# Patient Record
Sex: FEMALE | Race: WHITE | URBAN - METROPOLITAN AREA
[De-identification: names, ages, dates, MRNs, and addresses within clinical notes are randomized per-mention and may not be internally consistent; named-entity substitution may affect disease eponyms.]

---

## 2017-01-12 ENCOUNTER — TRANSFERRED RECORDS (OUTPATIENT)
Dept: HEALTH INFORMATION MANAGEMENT | Facility: CLINIC | Age: 67
End: 2017-01-12

## 2017-06-29 ENCOUNTER — TRANSFERRED RECORDS (OUTPATIENT)
Dept: HEALTH INFORMATION MANAGEMENT | Facility: CLINIC | Age: 67
End: 2017-06-29

## 2018-01-17 ENCOUNTER — TRANSFERRED RECORDS (OUTPATIENT)
Dept: HEALTH INFORMATION MANAGEMENT | Facility: CLINIC | Age: 68
End: 2018-01-17

## 2018-06-08 ENCOUNTER — TRANSFERRED RECORDS (OUTPATIENT)
Dept: HEALTH INFORMATION MANAGEMENT | Facility: CLINIC | Age: 68
End: 2018-06-08

## 2018-06-27 ENCOUNTER — TRANSFERRED RECORDS (OUTPATIENT)
Dept: HEALTH INFORMATION MANAGEMENT | Facility: CLINIC | Age: 68
End: 2018-06-27

## 2018-06-29 ENCOUNTER — TRANSFERRED RECORDS (OUTPATIENT)
Dept: HEALTH INFORMATION MANAGEMENT | Facility: CLINIC | Age: 68
End: 2018-06-29

## 2018-08-13 ENCOUNTER — TELEPHONE (OUTPATIENT)
Dept: NEUROLOGY | Facility: CLINIC | Age: 68
End: 2018-08-13

## 2018-08-13 NOTE — TELEPHONE ENCOUNTER
[facility]    Phone Message    May a detailed message be left on voicemail: yes    Reason for Call: Other: Please call the pt.  She has questions regarding a MyChart form the Dr wanted her to fill out.     Action Taken: Message routed to:  [facility] ([facility] Neurology

## 2018-10-03 ENCOUNTER — PATIENT OUTREACH (OUTPATIENT)
Dept: CARE COORDINATION | Facility: CLINIC | Age: 68
End: 2018-10-03

## 2018-10-04 ENCOUNTER — OFFICE VISIT (OUTPATIENT)
Dept: NEUROLOGY | Facility: CLINIC | Age: 68
End: 2018-10-04
Payer: COMMERCIAL

## 2018-10-04 VITALS
SYSTOLIC BLOOD PRESSURE: 110 MMHG | HEIGHT: 61 IN | DIASTOLIC BLOOD PRESSURE: 68 MMHG | OXYGEN SATURATION: 98 % | WEIGHT: 91.1 LBS | HEART RATE: 69 BPM | BODY MASS INDEX: 17.2 KG/M2

## 2018-10-04 DIAGNOSIS — R93.89 ABNORMAL MRI: Primary | ICD-10-CM

## 2018-10-04 PROBLEM — T42.4X2A INTENTIONAL BENZODIAZEPINE OVERDOSE (H): Status: ACTIVE | Noted: 2018-01-03

## 2018-10-04 PROBLEM — S72.002A CLOSED LEFT HIP FRACTURE, INITIAL ENCOUNTER (H): Status: ACTIVE | Noted: 2018-08-15

## 2018-10-04 PROBLEM — F45.1 SOMATIC SYMPTOM DISORDER, PERSISTENT, SEVERE, WITH PREDOMINANT PAIN: Status: ACTIVE | Noted: 2018-01-04

## 2018-10-04 PROBLEM — R45.851 SUICIDAL IDEATION: Status: ACTIVE | Noted: 2018-01-03

## 2018-10-04 PROBLEM — F41.9 ANXIETY: Status: ACTIVE | Noted: 2017-07-03

## 2018-10-04 PROBLEM — Z98.890 S/P ORIF (OPEN REDUCTION INTERNAL FIXATION) FRACTURE: Status: ACTIVE | Noted: 2018-08-29

## 2018-10-04 PROBLEM — S72.001A CLOSED FRACTURE OF RIGHT HIP (H): Status: ACTIVE | Noted: 2018-08-29

## 2018-10-04 PROBLEM — Z87.81 S/P ORIF (OPEN REDUCTION INTERNAL FIXATION) FRACTURE: Status: ACTIVE | Noted: 2018-08-29

## 2018-10-04 PROBLEM — E44.0 MODERATE PROTEIN-CALORIE MALNUTRITION (H): Status: ACTIVE | Noted: 2017-12-01

## 2018-10-04 RX ORDER — CARBOXYMETHYLCELLULOSE SODIUM 5 MG/ML
1 SOLUTION/ DROPS OPHTHALMIC PRN
COMMUNITY
Start: 2018-06-01

## 2018-10-04 RX ORDER — CALCIUM CARBONATE 500 MG/1
500 TABLET, CHEWABLE ORAL PRN
COMMUNITY
Start: 2018-08-23

## 2018-10-04 RX ORDER — ACETAMINOPHEN 325 MG/1
650 TABLET ORAL PRN
COMMUNITY
Start: 2018-08-18

## 2018-10-04 RX ORDER — FLUTICASONE PROPIONATE 50 MCG
2 SPRAY, SUSPENSION (ML) NASAL DAILY
COMMUNITY
Start: 2018-01-14 | End: 2019-01-14

## 2018-10-04 RX ORDER — CLONAZEPAM 1 MG/1
1 TABLET ORAL AT BEDTIME
Refills: 5 | COMMUNITY
Start: 2018-08-22

## 2018-10-04 RX ORDER — BACLOFEN 10 MG/1
TABLET ORAL PRN
Refills: 1 | COMMUNITY
Start: 2018-06-27

## 2018-10-04 ASSESSMENT — ENCOUNTER SYMPTOMS
PANIC: 0
DECREASED CONCENTRATION: 0
DIARRHEA: 1
DEPRESSION: 1
TINGLING: 1
NUMBNESS: 1
INSOMNIA: 1
TROUBLE SWALLOWING: 1
NERVOUS/ANXIOUS: 1
CONSTIPATION: 1
DOUBLE VISION: 1

## 2018-10-04 ASSESSMENT — PAIN SCALES - GENERAL: PAINLEVEL: EXTREME PAIN (9)

## 2018-10-04 NOTE — PROGRESS NOTES
DEPARTMENT OF NEUROLOGY    Patient Name:  Octavia Eason  MRN:  [id number]    :  1950  Date of Clinic Visit:  2018  Primary Care Provider:  Carly Wilde    CHIEF COMPLAINT: numbness    HISTORY OF PRESENT ILLNESS:  Octavia Eason is a 67 year old female presenting, history of burning mouth syndrome, GERD, heartburn, chronic balance issues, who presents for opinion on numbness.  She is accompanied by PCA.  She states the following:    Patient describes a 6-year history of numbness in her lower extremities primarily.  She does give several contradictory answers in this regard, but eventually settles on a potentially slow beginning mostly in the feet but also possibly in the calves all at once roughly 6 years ago.  This was potentially around the time that her  passed away.  She has since then intermittently had some balance issues.  She first states that she does not think that the numbness has ascended up her legs to her knees.  She later states that may be in fact this is the case.  She later states that she thinks she has been having months to may be years of a numb feeling in her hands.  She states that moving around in bed and movement of the bed sheets give her a funny feeling.  She denies that it is true pain.  She denies that is worsening of her numbness or other numbing or tingling.  She unfortunately does have a fair amount of trouble describing why she is here today.  We did discuss her various history of balance issues and related disorders as I was able to find them on chart review.  She does note that she has been following with physical therapy and pain medicine.  She thinks that everything is been going well.  However thinks she does state that she also believes things became worse several weeks ago following hip surgery.  She is unsure why.  When asked about new activities of daily living that perhaps her not as easy as they were prior to the surgery, she is unsure of any  "specifics.  She does note that she is on clonazepam and thinks that that helps.  She does not believe at present her burning mouth syndrome is an issue due to the clonazepam working.  She also states that the low, per chart review, she has had difficulties with loss of weight and adult failure to thrive, she does state that she is currently and most recently been gaining some of this weight back and feels quite happy that this is been the case.    Per chart review, the patient has previously been seen by at least 2 other neurologist, and the [facility] and previously at the [facility] of neurology.  She has at present had normal MRIs and EMGs per chart review.      REVIEW OF SYSTEMS:  A ten point review of systems was negative except as indicated above or in the patient's self-reported answers located at the end of this note.     ALLERGIES:  Allergies   Allergen Reactions     Duloxetine Hcl Other (See Comments)     Pilocarpine Other (See Comments)     Gabapentin Other (See Comments)     Racing heart     Lyrica [Pregabalin] Other (See Comments)     Felt \"High\"     Nortriptyline Unknown     Sulfa Drugs GI Disturbance     Tizanidine Other (See Comments)     Pt felt very unbalance and dizzy.      Tolterodine Unknown     Trazodone Unknown     MEDICATIONS:  Current Outpatient Prescriptions   Medication Sig Dispense Refill     acetaminophen (TYLENOL) 325 MG tablet Take 650 mg by mouth as needed       acetaminophen-codeine (TYLENOL #3) 300-30 MG per tablet Take by mouth as needed       baclofen (LIORESAL) 10 MG tablet as needed  1     bismuth subsalicylate (PEPTO BISMOL) 262 MG/15ML suspension Take 30 mLs by mouth daily       calcium carbonate (TUMS) 500 MG chewable tablet Take 500 mg by mouth as needed       carboxymethylcellulose (REFRESH PLUS) 0.5 % SOLN ophthalmic solution Apply 1 drop to eye as needed       cholecalciferol (VITAMIN D3) 1000 UNIT tablet Take 1,000 Units by mouth as needed       " "clonazePAM (KLONOPIN) 1 MG tablet Take 1 mg by mouth At Bedtime  5     FLUoxetine (PROZAC) 20 MG capsule Take 40 mg by mouth daily       fluticasone (FLONASE) 50 MCG/ACT spray 2 sprays daily       magic mouthwash (ENTER INGREDIENTS IN COMMENTS) suspension daily       omeprazole (PRILOSEC) 20 MG CR capsule Take 20 mg by mouth 2 times daily  1     SHINGRIX injection ADM 0.5ML IM UTD  0     PAST MEDICAL HISTORY:  No past medical history on file.     PAST SURGICAL HISTORY:  No past surgical history on file.     - Colon, does not remember the nature of this  - Plate put in on left hip, within last 3-4 weeks, she is unsure of the date      SOCIAL HISTORY:  Social History     Social History     Marital status:      Spouse name: N/A     Number of children: N/A     Years of education: N/A     Occupational History     Not on file.     Social History Main Topics     Smoking status: Never Smoker     Smokeless tobacco: Never Used     Alcohol use No     Drug use: No     Sexual activity: Not on file     Other Topics Concern     Not on file     Social History Narrative     No narrative on file     FAMILY HISTORY:  No family history on file.      PHYSICAL EXAMINATION:    Vitals:   /68 (BP Location: Left arm, Patient Position: Sitting, Cuff Size: Adult Small)  Pulse 69  Ht 1.537 m (5' 0.5\")  Wt 41.3 kg (91 lb 1.6 oz)  SpO2 98%  BMI 17.5 kg/m2    -General: Woman sitting comfortably on the chair. No acute distress. Underweight     -HEENT: No skin discolorations noted, no carotid bruits     -Chest: RRR without murmurs or bruits     -Abdomen: Positive bowel sounds, soft, non-tender, no organomegaly    -Musculoskeletal: Visible hammertoe in the left great toe    -Neurological:     --MS: Patient is alert, attentive, and oriented. Speech is clear and fluid though she is intermittently tangential. Names normally. Registration intact.     --> Mini-Co/5    --CNs: Visual fields are full to confrontation. Normal fundoscopic " exam with no visualized vascular changes. Pupils are briskly reactive to light. Visual fields full. Ocular motility full without nystagmus, facial sensation intact, muscles of mastication and facial expression normal, hearing intact, gag and palate elevation normal, sternomastoid and trapezius function normal, tongue motions normal     --Motor: Normal muscle tone and bulk. 5/5 muscle strength bilaterally in upper and lower extremities    --Reflexes: 3+ reflexes at knees and biceps, triceps, brachioradialis, and ankles. Plantar responses flexor bilaterally.  No clonus.    --Sensory: Light touch, vibration and PP intact bilaterally in upper and lower extremities.    --Coordination: Has some difficulties with mild tremor at the end of movement with finger-nose-finger on the left side.  Right FMF and is intact.  Heel doherty intact.  Romberg is negative.     --Gait: Stands with feet normally spaced.  Gait is slow but steady.  She is able to walk the duration of the hallway without her walker.  She is able to take 2-3 steps on her toes.  She was unable to walk on her heels or successfully perform tandem walk      INVESTIGATIONS:   All available and relevant labs, imaging, and other procedures were reviewed with the patient at this visit.       IMPRESSION AND RECOMMENDATIONS:  67-year-old woman with history of burning mouth syndrome, imbalance, GERD, who presents for opinion on ongoing numbness.  This is been present for over 6 years.  Per the available history, it does not clearly fit into a length dependent progressive pattern though this is the closest that she was able to describe her symptoms.  No clear deficits on examination; notably her examination does not support a length dependent neuropathy or give other clear evidence of neuropathy.  Significantly, she has and continues to have multiple neurological symptoms but has not however had any clearly abnormal test results.  Chart review revealed that she has had normal  MRIs of the brain and cervical spine and an EMG within the last year that did not reveal any clear evidence for her numbness or previously described balance difficulties.  At present, I do not see a clear reason to repeat these.  Given her stated history and examination, there is no clear evidence for an underlying neurological disease process to explain her symptoms at this time.  I would encourage her to continue to work with physical therapy and her pain management team as these seem to be per her report and per the chart review, the most significantly impacting interventions that she has had to date.  I discussed this with the patient who registered her understanding and agreement with this.  I also discussed with her that it does seem to be an overall complexity to her endorsed symptoms and care; significantly she also appears to be seeing multiple providers in multiple healthcare systems.  I do worry about fragmentation of care in this setting.  I did discuss that we would be happy to see her back in this clinic if she were to have new or different symptoms.  I however also asked that the referring physician call or message our clinic so that we may discuss with them their concerns prior to a new consult so that we could  assist in any necessary consolidation of her care.    This note was completed using dragon software.  All efforts were made to correct any unintended errors in real-time.    Patient was seen and discussed with Dr. Sada Mora MD  [facility] Department of Neurology PGY4    This is a supervisory note for Dr. Mora.  I have reviewed documentation above and also personally confirmed history and physical examination findings I agree with the plan as listed.  The patient has previously seen multiple other neurologist and undergone thorough workup without any clear neurologic disease being diagnosed.  Examination today is normal despite the patient's complaints.   At this point in time we concur with the neurologist findings from outside facilities that there is no clear underlying neurologic disorder.  The patient was provided reassurance.  Sada Maxwell MD FAAN      Answers for HPI/ROS submitted by the patient on 10/4/2018   General Symptoms: No  Skin Symptoms: No  HENT Symptoms: Yes  EYE SYMPTOMS: Yes  HEART SYMPTOMS: No  LUNG SYMPTOMS: No  INTESTINAL SYMPTOMS: Yes  URINARY SYMPTOMS: Yes  GYNECOLOGIC SYMPTOMS: No  BREAST SYMPTOMS: No  SKELETAL SYMPTOMS: No  BLOOD SYMPTOMS: No  NERVOUS SYSTEM SYMPTOMS: Yes  MENTAL HEALTH SYMPTOMS: Yes  Tinnitus: Yes  Trouble swallowing: Yes  Dry eyes: Yes  Double vision: Yes  Constipation: Yes  Diarrhea: Yes  Trouble holding urine or incontinence: Yes  Tingling: Yes  Numbness: Yes  Nervous or Anxious: Yes  Depression: Yes  Trouble sleeping: Yes  Trouble thinking or concentrating: No  Mood changes: No  Panic attacks: No  PHQ-2 Score: Incomplete

## 2018-10-04 NOTE — MR AVS SNAPSHOT
After Visit Summary   10/4/2018    Octavia Eason    MRN: [id number]           Patient Information     Date Of Birth          1950        Visit Information        Provider Department      10/4/2018 2:05 PM Davida Mora MD [facility] Neurology        Today's Diagnoses     Abnormal MRI    -  1      Care Instructions    It was a pleasure to see you in clinic today.    After discussing your symptoms and overall concerns, we discussed that we do not see any new evidence of an underlying neurological disease to explain your symptoms.  We discussed that he should continue to see physical therapy and pain management as well as her primary care physician for ongoing evaluation and care.  No new necessary testing from the neurology perspective given your examination today as well as your relatively recent negative MRIs and EMG.      Please call into neurology clinic or send a message via [employer] if you have any further questions or concerns.       Davida Mora MD  Neurology resident           Follow-ups after your visit        Follow-up notes from your care team     Return if symptoms worsen or fail to improve.      Who to contact     Please call your clinic at [phone] to:    Ask questions about your health    Make or cancel appointments    Discuss your medicines    Learn about your test results    Speak to your doctor            Additional Information About Your Visit        [facility] [employer] Information     [employer] gives you secure access to your electronic health record. If you see a primary care provider, you can also send messages to your care team and make appointments. If you have questions, please call your primary care clinic.  If you do not have a primary care provider, please call [phone] and they will assist you.      [employer] is an electronic gateway that provides easy, online access to your medical records. With [employer], you can request a clinic appointment, read your test results,  "renew a prescription or communicate with your care team.     To access your existing account, please contact your [facility] or call [phone] for assistance.        Care EveryWhere ID     This is your Care EveryWhere ID. This could be used by other organizations to access your [place] medical records  [id number]        Your Vitals Were     Pulse Height Pulse Oximetry BMI (Body Mass Index)          69 1.537 m (5' 0.5\") 98% 17.5 kg/m2         Blood Pressure from Last 3 Encounters:   10/04/18 110/68    Weight from Last 3 Encounters:   10/04/18 41.3 kg (91 lb 1.6 oz)              Today, you had the following     No orders found for display      Information about OPIOIDS     PRESCRIPTION OPIOIDS: WHAT YOU NEED TO KNOW   We gave you an opioid (narcotic) pain medicine. It is important to manage your pain, but opioids are not always the best choice. You should first try all the other options your care team gave you. Take this medicine for as short a time (and as few doses) as possible.    Some activities can increase your pain, such as bandage changes or therapy sessions. It may help to take your pain medicine 30 to 60 minutes before these activities. Reduce your stress by getting enough sleep, working on hobbies you enjoy and practicing relaxation or meditation. Talk to your care team about ways to manage your pain beyond prescription opioids.    These medicines have risks:    DO NOT drive when on new or higher doses of pain medicine. These medicines can affect your alertness and reaction times, and you could be arrested for driving under the influence (DUI). If you need to use opioids long-term, talk to your care team about driving.    DO NOT operate heavy machinery    DO NOT do any other dangerous activities while taking these medicines.    DO NOT drink any alcohol while taking these medicines.     If the opioid prescribed includes acetaminophen, DO NOT take with any other medicines " that contain acetaminophen. Read all labels carefully. Look for the word  acetaminophen  or  Tylenol.  Ask your pharmacist if you have questions or are unsure.    You can get addicted to pain medicines, especially if you have a history of addiction (chemical, alcohol or substance dependence). Talk to your care team about ways to reduce this risk.    All opioids tend to cause constipation. Drink plenty of water and eat foods that have a lot of fiber, such as fruits, vegetables, prune juice, apple juice and high-fiber cereal. Take a laxative (Miralax, milk of magnesia, Colace, Senna) if you don t move your bowels at least every other day. Other side effects include upset stomach, sleepiness, dizziness, throwing up, tolerance (needing more of the medicine to have the same effect), physical dependence and slowed breathing.    Store your pills in a secure place, locked if possible. We will not replace any lost or stolen medicine. If you don t finish your medicine, please throw away (dispose) as directed by your pharmacist. The [place] Pollution Control Agency has more information about safe disposal: [email or web address]         Primary Care Provider Office Phone # Fax #    Carly Wilde [phone]       [facility] [place] [address] [place] [address]        Equal Access to Services     ARCHIE KELLER : Hadii aurora ku hadasho Soomaali, waaxda luqadaha, qaybta kaalmada adesheliayada, mirella wick. So [facility] [phone].    ATENCIÓN: Si habla español, tiene a ngo disposición servicios gratuitos de asistencia lingüística. Monik wynn [phone].    We comply with applicable federal civil rights laws and [place] laws. We do not discriminate on the basis of race, color, national origin, age, disability, sex, sexual orientation, or gender identity.            Thank you!     Thank you for choosing [facility]  for your care. Our goal is always to provide you with excellent care. Hearing back from our patients is one way we can continue to improve our services. Please take a few minutes to complete the written survey that you may receive in the mail after your visit with us. Thank you!             Your Updated Medication List - Protect others around you: Learn how to safely use, store and throw away your medicines at www.disposemymeds.org.          This list is accurate as of 10/4/18 11:59 PM.  Always use your most recent med list.                   Brand Name Dispense Instructions for use Diagnosis    acetaminophen 325 MG tablet    TYLENOL     Take 650 mg by mouth as needed        acetaminophen-codeine 300-30 MG per tablet    TYLENOL #3     Take by mouth as needed        baclofen 10 MG tablet    LIORESAL     as needed        bismuth subsalicylate 262 MG/15ML suspension    PEPTO BISMOL     Take 30 mLs by mouth daily        calcium carbonate 500 MG chewable tablet    TUMS     Take 500 mg by mouth as needed        carboxymethylcellulose 0.5 % Soln ophthalmic solution    REFRESH PLUS     Apply 1 drop to eye as needed        cholecalciferol 1000 UNIT tablet    vitamin D3     Take 1,000 Units by mouth as needed        clonazePAM 1 MG tablet    klonoPIN     Take 1 mg by mouth At Bedtime        FLUoxetine 20 MG capsule    PROzac     Take 40 mg by mouth daily        fluticasone 50 MCG/ACT spray    FLONASE     2 sprays daily        magic mouthwash suspension    ENTER INGREDIENTS IN COMMENTS     daily        omeprazole 20 MG CR capsule    priLOSEC     Take 20 mg by mouth 2 times daily        SHINGRIX injection   Generic drug:  zoster vaccine recombinant adjuvanted      ADM 0.5ML IM UTD

## 2018-10-04 NOTE — NURSING NOTE
Chief Complaint   Patient presents with     New Patient     P RETURN - POSSIBLE DEMYLENATING DISEASE       Depression Response    Patient completed the PHQ-9 assessment for depression and scored >9? Yes  Question 9 on the PHQ-9 was positive for suicidality? No  Is the patient already receiving treatment for depression? Yes  Patient would like to speak with behavioral health team ([facility] clinics only)? No    I personally notified the following: visit provider    Behavioral Health/Social Work Contact Information     [facility]  Go Puga MA, LMFT  Lead Behavioral Health Clinician  Phone: [phone]  [facility] Pager: [phone]    Non-[facility] Clinics  [facility] On-Call   Pager: [phone]       Memo Aly, EMT

## 2018-10-04 NOTE — LETTER
10/4/2018       RE: Octavia Eason  [address]  [address]  [place] [address]     Dear Colleague,    Thank you for referring your patient, Octavia Eason, to the [facility]UR[facility] at [facility]. Please see a copy of my visit note below.      DEPARTMENT OF NEUROLOGY    Patient Name:  Octavia Eason  MRN:  [id number]    :  1950  Date of Clinic Visit:  2018  Primary Care Provider:  Carly Wilde    CHIEF COMPLAINT: numbness    HISTORY OF PRESENT ILLNESS:  Octavia Eason is a 67 year old female presenting, history of burning mouth syndrome, GERD, heartburn, chronic balance issues, who presents for opinion on numbness.  She is accompanied by PCA.  She states the following:    Patient describes a 6-year history of numbness in her lower extremities primarily.  She does give several contradictory answers in this regard, but eventually settles on a potentially slow beginning mostly in the feet but also possibly in the calves all at once roughly 6 years ago.  This was potentially around the time that her  passed away.  She has since then intermittently had some balance issues.  She first states that she does not think that the numbness has ascended up her legs to her knees.  She later states that may be in fact this is the case.  She later states that she thinks she has been having months to may be years of a numb feeling in her hands.  She states that moving around in bed and movement of the bed sheets give her a funny feeling.  She denies that it is true pain.  She denies that is worsening of her numbness or other numbing or tingling.  She unfortunately does have a fair amount of trouble describing why she is here today.  We did discuss her various history of balance issues and related disorders as I was able to find them on chart review.  She does note that she has been following with physical therapy and pain medicine.  She thinks that everything  "is been going well.  However thinks she does state that she also believes things became worse several weeks ago following hip surgery.  She is unsure why.  When asked about new activities of daily living that perhaps her not as easy as they were prior to the surgery, she is unsure of any specifics.  She does note that she is on clonazepam and thinks that that helps.  She does not believe at present her burning mouth syndrome is an issue due to the clonazepam working.  She also states that the low, per chart review, she has had difficulties with loss of weight and adult failure to thrive, she does state that she is currently and most recently been gaining some of this weight back and feels quite happy that this is been the case.    Per chart review, the patient has previously been seen by at least 2 other neurologist, and the [facility] system and previously at the [facility] of neurology.  She has at present had normal MRIs and EMGs per chart review.      REVIEW OF SYSTEMS:  A ten point review of systems was negative except as indicated above or in the patient's self-reported answers located at the end of this note.     ALLERGIES:  Allergies   Allergen Reactions     Duloxetine Hcl Other (See Comments)     Pilocarpine Other (See Comments)     Gabapentin Other (See Comments)     Racing heart     Lyrica [Pregabalin] Other (See Comments)     Felt \"High\"     Nortriptyline Unknown     Sulfa Drugs GI Disturbance     Tizanidine Other (See Comments)     Pt felt very unbalance and dizzy.      Tolterodine Unknown     Trazodone Unknown     MEDICATIONS:  Current Outpatient Prescriptions   Medication Sig Dispense Refill     acetaminophen (TYLENOL) 325 MG tablet Take 650 mg by mouth as needed       acetaminophen-codeine (TYLENOL #3) 300-30 MG per tablet Take by mouth as needed       baclofen (LIORESAL) 10 MG tablet as needed  1     bismuth subsalicylate (PEPTO BISMOL) 262 MG/15ML suspension Take 30 mLs by mouth daily  " "     calcium carbonate (TUMS) 500 MG chewable tablet Take 500 mg by mouth as needed       carboxymethylcellulose (REFRESH PLUS) 0.5 % SOLN ophthalmic solution Apply 1 drop to eye as needed       cholecalciferol (VITAMIN D3) 1000 UNIT tablet Take 1,000 Units by mouth as needed       clonazePAM (KLONOPIN) 1 MG tablet Take 1 mg by mouth At Bedtime  5     FLUoxetine (PROZAC) 20 MG capsule Take 40 mg by mouth daily       fluticasone (FLONASE) 50 MCG/ACT spray 2 sprays daily       magic mouthwash (ENTER INGREDIENTS IN COMMENTS) suspension daily       omeprazole (PRILOSEC) 20 MG CR capsule Take 20 mg by mouth 2 times daily  1     SHINGRIX injection ADM 0.5ML IM UTD  0     PAST MEDICAL HISTORY:  No past medical history on file.     PAST SURGICAL HISTORY:  No past surgical history on file.     - Colon, does not remember the nature of this  - Plate put in on left hip, within last 3-4 weeks, she is unsure of the date      SOCIAL HISTORY:  Social History     Social History     Marital status:      Spouse name: N/A     Number of children: N/A     Years of education: N/A     Occupational History     Not on file.     Social History Main Topics     Smoking status: Never Smoker     Smokeless tobacco: Never Used     Alcohol use No     Drug use: No     Sexual activity: Not on file     Other Topics Concern     Not on file     Social History Narrative     No narrative on file     FAMILY HISTORY:  No family history on file.      PHYSICAL EXAMINATION:    Vitals:   /68 (BP Location: Left arm, Patient Position: Sitting, Cuff Size: Adult Small)  Pulse 69  Ht 1.537 m (5' 0.5\")  Wt 41.3 kg (91 lb 1.6 oz)  SpO2 98%  BMI 17.5 kg/m2    -General: Woman sitting comfortably on the chair. No acute distress. Underweight     -HEENT: No skin discolorations noted, no carotid bruits     -Chest: RRR without murmurs or bruits     -Abdomen: Positive bowel sounds, soft, non-tender, no organomegaly    -Musculoskeletal: Visible hammertoe in " the left great toe    -Neurological:     --MS: Patient is alert, attentive, and oriented. Speech is clear and fluid though she is intermittently tangential. Names normally. Registration intact.     --> Mini-Co/5    --CNs: Visual fields are full to confrontation. Normal fundoscopic exam with no visualized vascular changes. Pupils are briskly reactive to light. Visual fields full. Ocular motility full without nystagmus, facial sensation intact, muscles of mastication and facial expression normal, hearing intact, gag and palate elevation normal, sternomastoid and trapezius function normal, tongue motions normal     --Motor: Normal muscle tone and bulk. 5/5 muscle strength bilaterally in upper and lower extremities    --Reflexes: 3+ reflexes at knees and biceps, triceps, brachioradialis, and ankles. Plantar responses flexor bilaterally.  No clonus.    --Sensory: Light touch, vibration and PP intact bilaterally in upper and lower extremities.    --Coordination: Has some difficulties with mild tremor at the end of movement with finger-nose-finger on the left side.  Right FMF and is intact.  Heel doherty intact.  Romberg is negative.     --Gait: Stands with feet normally spaced.  Gait is slow but steady.  She is able to walk the duration of the hallway without her walker.  She is able to take 2-3 steps on her toes.  She was unable to walk on her heels or successfully perform tandem walk      INVESTIGATIONS:   All available and relevant labs, imaging, and other procedures were reviewed with the patient at this visit.       IMPRESSION AND RECOMMENDATIONS:  67-year-old woman with history of burning mouth syndrome, imbalance, GERD, who presents for opinion on ongoing numbness.  This is been present for over 6 years.  Per the available history, it does not clearly fit into a length dependent progressive pattern though this is the closest that she was able to describe her symptoms.  No clear deficits on examination; notably her  examination does not support a length dependent neuropathy or give other clear evidence of neuropathy.  Significantly, she has and continues to have multiple neurological symptoms but has not however had any clearly abnormal test results.  Chart review revealed that she has had normal MRIs of the brain and cervical spine and an EMG within the last year that did not reveal any clear evidence for her numbness or previously described balance difficulties.  At present, I do not see a clear reason to repeat these.  Given her stated history and examination, there is no clear evidence for an underlying neurological disease process to explain her symptoms at this time.  I would encourage her to continue to work with physical therapy and her pain management team as these seem to be per her report and per the chart review, the most significantly impacting interventions that she has had to date.  I discussed this with the patient who registered her understanding and agreement with this.  I also discussed with her that it does seem to be an overall complexity to her endorsed symptoms and care; significantly she also appears to be seeing multiple providers in multiple healthcare systems.  I do worry about fragmentation of care in this setting.  I did discuss that we would be happy to see her back in this clinic if she were to have new or different symptoms.  I however also asked that the referring physician call or message our clinic so that we may discuss with them their concerns prior to a new consult so that we could  assist in any necessary consolidation of her care.    This note was completed using dragon software.  All efforts were made to correct any unintended errors in real-time.    Patient was seen and discussed with Dr. Sada Mora MD  [facility] Department of Neurology PGY4    This is a supervisory note for Dr. Mora.  I have reviewed documentation above and also personally  confirmed history and physical examination findings I agree with the plan as listed.  The patient has previously seen multiple other neurologist and undergone thorough workup without any clear neurologic disease being diagnosed.  Examination today is normal despite the patient's complaints.  At this point in time we concur with the neurologist findings from outside facilities that there is no clear underlying neurologic disorder.  The patient was provided reassurance.  Sada Maxwell MD FAAN      Answers for HPI/ROS submitted by the patient on 10/4/2018   General Symptoms: No  Skin Symptoms: No  HENT Symptoms: Yes  EYE SYMPTOMS: Yes  HEART SYMPTOMS: No  LUNG SYMPTOMS: No  INTESTINAL SYMPTOMS: Yes  URINARY SYMPTOMS: Yes  GYNECOLOGIC SYMPTOMS: No  BREAST SYMPTOMS: No  SKELETAL SYMPTOMS: No  BLOOD SYMPTOMS: No  NERVOUS SYSTEM SYMPTOMS: Yes  MENTAL HEALTH SYMPTOMS: Yes  Tinnitus: Yes  Trouble swallowing: Yes  Dry eyes: Yes  Double vision: Yes  Constipation: Yes  Diarrhea: Yes  Trouble holding urine or incontinence: Yes  Tingling: Yes  Numbness: Yes  Nervous or Anxious: Yes  Depression: Yes  Trouble sleeping: Yes  Trouble thinking or concentrating: No  Mood changes: No  Panic attacks: No  PHQ-2 Score: Incomplete        Davida Mora MD

## 2018-10-05 ASSESSMENT — PATIENT HEALTH QUESTIONNAIRE - PHQ9: SUM OF ALL RESPONSES TO PHQ QUESTIONS 1-9: 15

## 2018-10-06 NOTE — PATIENT INSTRUCTIONS
It was a pleasure to see you in clinic today.    After discussing your symptoms and overall concerns, we discussed that we do not see any new evidence of an underlying neurological disease to explain your symptoms.  We discussed that he should continue to see physical therapy and pain management as well as her primary care physician for ongoing evaluation and care.  No new necessary testing from the neurology perspective given your examination today as well as your relatively recent negative MRIs and EMG.      Please call into neurology clinic or send a message via [employer] if you have any further questions or concerns.       Davida Mora MD  Neurology resident

## 2019-11-02 ENCOUNTER — HEALTH MAINTENANCE LETTER (OUTPATIENT)
Age: 69
End: 2019-11-02

## 2020-02-10 ENCOUNTER — HEALTH MAINTENANCE LETTER (OUTPATIENT)
Age: 70
End: 2020-02-10

## 2020-11-14 ENCOUNTER — HEALTH MAINTENANCE LETTER (OUTPATIENT)
Age: 70
End: 2020-11-14

## 2021-04-03 ENCOUNTER — HEALTH MAINTENANCE LETTER (OUTPATIENT)
Age: 71
End: 2021-04-03

## 2021-09-12 ENCOUNTER — HEALTH MAINTENANCE LETTER (OUTPATIENT)
Age: 71
End: 2021-09-12

## 2022-04-24 ENCOUNTER — HEALTH MAINTENANCE LETTER (OUTPATIENT)
Age: 72
End: 2022-04-24

## 2022-11-19 ENCOUNTER — HEALTH MAINTENANCE LETTER (OUTPATIENT)
Age: 72
End: 2022-11-19

## 2023-06-01 ENCOUNTER — HEALTH MAINTENANCE LETTER (OUTPATIENT)
Age: 73
End: 2023-06-01